# Patient Record
Sex: MALE | Race: WHITE | Employment: FULL TIME | URBAN - METROPOLITAN AREA
[De-identification: names, ages, dates, MRNs, and addresses within clinical notes are randomized per-mention and may not be internally consistent; named-entity substitution may affect disease eponyms.]

---

## 2017-07-11 ENCOUNTER — TELEPHONE (OUTPATIENT)
Dept: ORTHOPEDIC SURGERY | Age: 37
End: 2017-07-11

## 2017-09-06 ENCOUNTER — TELEPHONE (OUTPATIENT)
Dept: ORTHOPEDIC SURGERY | Age: 37
End: 2017-09-06

## 2018-08-16 ENCOUNTER — TELEPHONE (OUTPATIENT)
Dept: ORTHOPEDIC SURGERY | Age: 38
End: 2018-08-16

## 2018-08-16 NOTE — TELEPHONE ENCOUNTER
GAVE TO BENNIE RONQUILLO TO SCAN IN [facility];  8/21/18 RECEIVED CHECK# [id number] $7.00 FOR XRAY CD  MAILED CD TO ADDRESS ON REQUEST FORM. MEDICAL RECORDS/ITEMIZED BILLING FOR ([employer]) Reji Muñiz ATTORNEYS FROM 10/30/15 TO PRESENT.

## 2018-08-24 NOTE — TELEPHONE ENCOUNTER
SCANNED IN [facility];  8/21/18 RECEIVED CHECK# [id number] $7.00 FOR XRAY CD  MAILED CD TO ADDRESS ON REQUEST FORM. MEDICAL RECORDS/ITEMIZED BILLING FOR ([facility] [employer]) Kobi Crawford ATTORNEYS FROM 10/30/15 TO PRESENT.